# Patient Record
Sex: FEMALE | Race: WHITE | NOT HISPANIC OR LATINO | Employment: FULL TIME | URBAN - METROPOLITAN AREA
[De-identification: names, ages, dates, MRNs, and addresses within clinical notes are randomized per-mention and may not be internally consistent; named-entity substitution may affect disease eponyms.]

---

## 2018-10-09 ENCOUNTER — TELEPHONE (OUTPATIENT)
Dept: OBSTETRICS AND GYNECOLOGY | Age: 28
End: 2018-10-09

## 2018-10-09 NOTE — TELEPHONE ENCOUNTER
NNOB  lmp- 8/25/2018    Had an appointment 4/10/2018 for a new ob and no showed because she miscarried. Patient is now pregnant again and is wanting to see Dr. Cantu. Patient stated she use to see Dr. Barrow for her last pregnancy but prefers a male provider.       Okay to schedule with me if all right with Dr. Jeffries

## 2019-01-17 ENCOUNTER — APPOINTMENT (OUTPATIENT)
Dept: ULTRASOUND IMAGING | Facility: HOSPITAL | Age: 29
End: 2019-01-17

## 2019-01-17 ENCOUNTER — HOSPITAL ENCOUNTER (EMERGENCY)
Facility: HOSPITAL | Age: 29
Discharge: HOME | End: 2019-01-17
Attending: EMERGENCY MEDICINE | Admitting: EMERGENCY MEDICINE

## 2019-01-17 VITALS
HEIGHT: 69 IN | HEART RATE: 89 BPM | DIASTOLIC BLOOD PRESSURE: 85 MMHG | RESPIRATION RATE: 18 BRPM | WEIGHT: 215 LBS | TEMPERATURE: 98 F | OXYGEN SATURATION: 100 % | BODY MASS INDEX: 31.84 KG/M2 | SYSTOLIC BLOOD PRESSURE: 117 MMHG

## 2019-01-17 DIAGNOSIS — O20.0 THREATENED MISCARRIAGE: Primary | ICD-10-CM

## 2019-01-17 LAB
ABO GROUP BLD: NORMAL
ALBUMIN SERPL-MCNC: 3.8 G/DL (ref 3.5–5.2)
ALBUMIN/GLOB SERPL: 1.4 G/DL
ALP SERPL-CCNC: 76 U/L (ref 39–117)
ALT SERPL W P-5'-P-CCNC: 14 U/L (ref 1–33)
ANION GAP SERPL CALCULATED.3IONS-SCNC: 9.5 MMOL/L
AST SERPL-CCNC: 12 U/L (ref 1–32)
BASOPHILS # BLD AUTO: 0.04 10*3/MM3 (ref 0–0.2)
BASOPHILS NFR BLD AUTO: 0.5 % (ref 0–1.5)
BILIRUB SERPL-MCNC: 0.2 MG/DL (ref 0.1–1.2)
BLD GP AB SCN SERPL QL: NEGATIVE
BUN BLD-MCNC: 10 MG/DL (ref 6–20)
BUN/CREAT SERPL: 13.3 (ref 7–25)
CALCIUM SPEC-SCNC: 9.1 MG/DL (ref 8.6–10.5)
CHLORIDE SERPL-SCNC: 102 MMOL/L (ref 98–107)
CO2 SERPL-SCNC: 28.5 MMOL/L (ref 22–29)
CREAT BLD-MCNC: 0.75 MG/DL (ref 0.57–1)
DEPRECATED RDW RBC AUTO: 40.5 FL (ref 37–54)
EOSINOPHIL # BLD AUTO: 0.24 10*3/MM3 (ref 0–0.7)
EOSINOPHIL NFR BLD AUTO: 2.8 % (ref 0.3–6.2)
ERYTHROCYTE [DISTWIDTH] IN BLOOD BY AUTOMATED COUNT: 12.7 % (ref 11.7–13)
GFR SERPL CREATININE-BSD FRML MDRD: 111 ML/MIN/1.73
GFR SERPL CREATININE-BSD FRML MDRD: 91 ML/MIN/1.73
GLOBULIN UR ELPH-MCNC: 2.8 GM/DL
GLUCOSE BLD-MCNC: 94 MG/DL (ref 65–99)
HCG INTACT+B SERPL-ACNC: 16.79 MIU/ML
HCT VFR BLD AUTO: 40.9 % (ref 35.6–45.5)
HGB BLD-MCNC: 13.4 G/DL (ref 11.9–15.5)
IMM GRANULOCYTES # BLD AUTO: 0 10*3/MM3 (ref 0–0.03)
IMM GRANULOCYTES NFR BLD AUTO: 0 % (ref 0–0.5)
LYMPHOCYTES # BLD AUTO: 2.47 10*3/MM3 (ref 0.9–4.8)
LYMPHOCYTES NFR BLD AUTO: 28.4 % (ref 19.6–45.3)
MCH RBC QN AUTO: 28.9 PG (ref 26.9–32)
MCHC RBC AUTO-ENTMCNC: 32.8 G/DL (ref 32.4–36.3)
MCV RBC AUTO: 88.3 FL (ref 80.5–98.2)
MONOCYTES # BLD AUTO: 0.84 10*3/MM3 (ref 0.2–1.2)
MONOCYTES NFR BLD AUTO: 9.6 % (ref 5–12)
NEUTROPHILS # BLD AUTO: 5.12 10*3/MM3 (ref 1.9–8.1)
NEUTROPHILS NFR BLD AUTO: 58.7 % (ref 42.7–76)
PLATELET # BLD AUTO: 192 10*3/MM3 (ref 140–500)
PMV BLD AUTO: 10.2 FL (ref 6–12)
POTASSIUM BLD-SCNC: 4 MMOL/L (ref 3.5–5.2)
PROT SERPL-MCNC: 6.6 G/DL (ref 6–8.5)
RBC # BLD AUTO: 4.63 10*6/MM3 (ref 3.9–5.2)
RH BLD: POSITIVE
SODIUM BLD-SCNC: 140 MMOL/L (ref 136–145)
T&S EXPIRATION DATE: NORMAL
WBC NRBC COR # BLD: 8.71 10*3/MM3 (ref 4.5–10.7)

## 2019-01-17 PROCEDURE — 85025 COMPLETE CBC W/AUTO DIFF WBC: CPT | Performed by: EMERGENCY MEDICINE

## 2019-01-17 PROCEDURE — 76817 TRANSVAGINAL US OBSTETRIC: CPT

## 2019-01-17 PROCEDURE — 93976 VASCULAR STUDY: CPT

## 2019-01-17 PROCEDURE — 86850 RBC ANTIBODY SCREEN: CPT | Performed by: EMERGENCY MEDICINE

## 2019-01-17 PROCEDURE — 86900 BLOOD TYPING SEROLOGIC ABO: CPT | Performed by: EMERGENCY MEDICINE

## 2019-01-17 PROCEDURE — 80053 COMPREHEN METABOLIC PANEL: CPT | Performed by: EMERGENCY MEDICINE

## 2019-01-17 PROCEDURE — 86901 BLOOD TYPING SEROLOGIC RH(D): CPT | Performed by: EMERGENCY MEDICINE

## 2019-01-17 PROCEDURE — 99284 EMERGENCY DEPT VISIT MOD MDM: CPT

## 2019-01-17 PROCEDURE — 76815 OB US LIMITED FETUS(S): CPT

## 2019-01-17 PROCEDURE — 84702 CHORIONIC GONADOTROPIN TEST: CPT | Performed by: EMERGENCY MEDICINE

## 2019-01-17 RX ORDER — IBUPROFEN 200 MG
600 TABLET ORAL EVERY 6 HOURS PRN
COMMUNITY

## 2019-01-17 RX ORDER — PSEUDOEPHEDRINE HYDROCHLORIDE 60 MG/1
60 TABLET, FILM COATED ORAL EVERY 4 HOURS PRN
COMMUNITY

## 2019-01-17 NOTE — ED NOTES
Pt states that she had been having vaginal bleeding since 1/5/19. Pt states that she thinks that she is having a miscarriage but states that she has not taken a pregnancy test. Pt states that she had a miscarriage in April and one is September. Pt states that she missed her OBGYN appt on Friday and spoke with them Friday evening and they told her to come to the ED for evaluation but Pt thought it would stop so she waited. Pt also c/o nausea x's 1 week.   Gardenia Leone, SINGH  01/17/19 1814       Gardenia Leone, RN  01/17/19 1814

## 2019-01-17 NOTE — ED TRIAGE NOTES
Pt believes she may have been pregnant. Has been having vaginal bleeding x 2 weeks, came to ER to eval for miscarriage.

## 2019-01-18 NOTE — ED NOTES
Pt c/o vaginal bleeding that started 1/5/19. Denies abd cramping currently. Lmp:12/10/18. Pt states that she hasn't taken a pregnancy test because she doesn't want to see that its positive. Pt had two miscarriages previously     Swetha Garcia, RN  01/17/19 1941

## 2019-01-18 NOTE — ED PROVIDER NOTES
" EMERGENCY DEPARTMENT ENCOUNTER    Room Number:  35/35  PCP: Lenard Olmedo MD   OBGYN: Dr. Griffin  Historian: Patient, Significant Other      HPI  Chief Complaint: Vaginal Bleeding  Context: Rosanna Sullivan is a 28 y.o. female who presents to the ED c/o constant vaginal bleeding onset about 2 weeks ago. Pt reports that she has also had mild \"cramping\" lower abdominal pain and nausea. Pt denies vomiting, diarrhea, chest pain, dyspnea, dysuria, generalized weakness, and dizziness/lightheadedness. Pt reports that she may be pregnant currently. Pt reports that in the past year, pt has had 2 spontaneous miscarriages. G3, P1, Ab2. There are no other complaints at this time.       Location: Female genitourinary  Radiation: N/A  Character: \"vaginal bleeding\"  Duration: Onset about 2 weeks ago  Severity: Moderate  Progression: Unchanged  Aggravating Factors: Nothing  Alleviating Factors: Nothing          PAST MEDICAL HISTORY  Active Ambulatory Problems     Diagnosis Date Noted   • No Active Ambulatory Problems     Resolved Ambulatory Problems     Diagnosis Date Noted   • No Resolved Ambulatory Problems     No Additional Past Medical History         PAST SURGICAL HISTORY  Past Surgical History:   Procedure Laterality Date   • ADENOIDECTOMY     • APPENDECTOMY     •  SECTION     • GASTRIC SLEEVE LAPAROSCOPIC     • TONSILLECTOMY           FAMILY HISTORY  History reviewed. No pertinent family history.      SOCIAL HISTORY  Social History     Socioeconomic History   • Marital status: Single     Spouse name: Not on file   • Number of children: Not on file   • Years of education: Not on file   • Highest education level: Not on file   Social Needs   • Financial resource strain: Not on file   • Food insecurity - worry: Not on file   • Food insecurity - inability: Not on file   • Transportation needs - medical: Not on file   • Transportation needs - non-medical: Not on file   Occupational History   • Not on file   Tobacco " Use   • Smoking status: Current Some Day Smoker   Substance and Sexual Activity   • Alcohol use: No     Frequency: Never   • Drug use: No   • Sexual activity: Yes   Other Topics Concern   • Not on file   Social History Narrative   • Not on file         ALLERGIES  Patient has no known allergies.        REVIEW OF SYSTEMS  Review of Systems   Constitutional: Negative for chills.   HENT: Negative for congestion, rhinorrhea and sore throat.    Eyes: Negative for pain.   Respiratory: Negative for cough and shortness of breath.    Cardiovascular: Negative for chest pain, palpitations and leg swelling.   Gastrointestinal: Positive for abdominal pain and nausea. Negative for diarrhea and vomiting.   Genitourinary: Positive for vaginal bleeding. Negative for difficulty urinating, dysuria, flank pain and frequency.   Musculoskeletal: Negative for myalgias, neck pain and neck stiffness.   Skin: Negative for rash.   Neurological: Negative for dizziness, speech difficulty, weakness, light-headedness, numbness and headaches.   Psychiatric/Behavioral: Negative.    All other systems reviewed and are negative.           PHYSICAL EXAM  ED Triage Vitals   Temp Heart Rate Resp BP SpO2   01/17/19 1752 01/17/19 1752 01/17/19 1752 01/17/19 1814 01/17/19 1752   98 °F (36.7 °C) 88 16 147/80 100 %      Temp src Heart Rate Source Patient Position BP Location FiO2 (%)   -- 01/17/19 1814 01/17/19 1814 01/17/19 1814 --    Monitor Sitting Left arm        Physical Exam   Constitutional: She is oriented to person, place, and time. No distress.   HENT:   Head: Normocephalic.   Mouth/Throat: Mucous membranes are normal.   Eyes: EOM are normal. Pupils are equal, round, and reactive to light.   Neck: Normal range of motion. Neck supple.   Cardiovascular: Normal rate, regular rhythm and normal heart sounds.   Pulmonary/Chest: Effort normal and breath sounds normal. No respiratory distress. She has no decreased breath sounds. She has no wheezes. She has  no rhonchi. She has no rales.   Abdominal: Soft. There is no tenderness. There is no rebound and no guarding.   Genitourinary: Cervix exhibits no motion tenderness.   Genitourinary Comments: No vaginal bleeding. Cervical os is closed.    Musculoskeletal: Normal range of motion.   Neurological: She is alert and oriented to person, place, and time. She has normal sensation.   Skin: Skin is warm and dry.   Psychiatric: Mood and affect normal.   Nursing note and vitals reviewed.          LAB RESULTS  Recent Results (from the past 24 hour(s))   Comprehensive Metabolic Panel    Collection Time: 01/17/19  7:59 PM   Result Value Ref Range    Glucose 94 65 - 99 mg/dL    BUN 10 6 - 20 mg/dL    Creatinine 0.75 0.57 - 1.00 mg/dL    Sodium 140 136 - 145 mmol/L    Potassium 4.0 3.5 - 5.2 mmol/L    Chloride 102 98 - 107 mmol/L    CO2 28.5 22.0 - 29.0 mmol/L    Calcium 9.1 8.6 - 10.5 mg/dL    Total Protein 6.6 6.0 - 8.5 g/dL    Albumin 3.80 3.50 - 5.20 g/dL    ALT (SGPT) 14 1 - 33 U/L    AST (SGOT) 12 1 - 32 U/L    Alkaline Phosphatase 76 39 - 117 U/L    Total Bilirubin 0.2 0.1 - 1.2 mg/dL    eGFR Non African Amer 91 >60 mL/min/1.73    eGFR  African Amer 111 >60 mL/min/1.73    Globulin 2.8 gm/dL    A/G Ratio 1.4 g/dL    BUN/Creatinine Ratio 13.3 7.0 - 25.0    Anion Gap 9.5 mmol/L   hCG, Quantitative, Pregnancy    Collection Time: 01/17/19  7:59 PM   Result Value Ref Range    HCG Quantitative 16.79 mIU/mL   Type & Screen    Collection Time: 01/17/19  7:59 PM   Result Value Ref Range    ABO Type A     RH type Positive     Antibody Screen Negative     T&S Expiration Date 1/20/2019 11:59:59 PM    CBC Auto Differential    Collection Time: 01/17/19  7:59 PM   Result Value Ref Range    WBC 8.71 4.50 - 10.70 10*3/mm3    RBC 4.63 3.90 - 5.20 10*6/mm3    Hemoglobin 13.4 11.9 - 15.5 g/dL    Hematocrit 40.9 35.6 - 45.5 %    MCV 88.3 80.5 - 98.2 fL    MCH 28.9 26.9 - 32.0 pg    MCHC 32.8 32.4 - 36.3 g/dL    RDW 12.7 11.7 - 13.0 %    RDW-SD 40.5  37.0 - 54.0 fl    MPV 10.2 6.0 - 12.0 fL    Platelets 192 140 - 500 10*3/mm3    Neutrophil % 58.7 42.7 - 76.0 %    Lymphocyte % 28.4 19.6 - 45.3 %    Monocyte % 9.6 5.0 - 12.0 %    Eosinophil % 2.8 0.3 - 6.2 %    Basophil % 0.5 0.0 - 1.5 %    Immature Grans % 0.0 0.0 - 0.5 %    Neutrophils, Absolute 5.12 1.90 - 8.10 10*3/mm3    Lymphocytes, Absolute 2.47 0.90 - 4.80 10*3/mm3    Monocytes, Absolute 0.84 0.20 - 1.20 10*3/mm3    Eosinophils, Absolute 0.24 0.00 - 0.70 10*3/mm3    Basophils, Absolute 0.04 0.00 - 0.20 10*3/mm3    Immature Grans, Absolute 0.00 0.00 - 0.03 10*3/mm3       Ordered the above labs and reviewed the results.        RADIOLOGY  US Ob Transvaginal   Final Result   Small amount of endometrial fluid without IUP demonstrated.   Follow-up as above    FINDINGS: Longitudinal and transverse images of the pelvis were obtained  in both transabdominal and transvaginal fashions. Uterus measures 8.6 x  6 x 4.3 cm in greatest dimensions. A small collection of fluid is  present within the upper endometrial canal but without demonstrable  fetal pole, yolk sac, or cardiac activity. These findings may reflect  sequelae of failed pregnancy. Ovaries were identified. They're normal in  appearance. Normal color flow. No free fluid.     Exam does not exclude ectopic pregnancy. Obstetrical follow-up is  recommended.       This report was finalized on 1/17/2019 10:07 PM by Shyam Butterfield M.D.          US Ob Limited 1 + Fetuses -     Small amount of endometrial fluid without IUP demonstrated.  Follow-up as above          FINDINGS: Longitudinal and transverse images of the pelvis were obtained  in both transabdominal and transvaginal fashions. Uterus measures 8.6 x  6 x 4.3 cm in greatest dimensions. A small collection of fluid is  present within the upper endometrial canal but without demonstrable  fetal pole, yolk sac, or cardiac activity. These findings may reflect  sequelae of failed pregnancy. Ovaries were identified.  They're normal in  appearance. Normal color flow. No free fluid.     Exam does not exclude ectopic pregnancy. Obstetrical follow-up is  Recommended.     US Testicular or Ovarian Vascular Limited -       Small amount of endometrial fluid without IUP demonstrated.  Follow-up as above            FINDINGS: Longitudinal and transverse images of the pelvis were obtained  in both transabdominal and transvaginal fashions. Uterus measures 8.6 x  6 x 4.3 cm in greatest dimensions. A small collection of fluid is  present within the upper endometrial canal but without demonstrable  fetal pole, yolk sac, or cardiac activity. These findings may reflect  sequelae of failed pregnancy. Ovaries were identified. They're normal in  appearance. Normal color flow. No free fluid.     Exam does not exclude ectopic pregnancy. Obstetrical follow-up is  recommended.        Ordered the above noted radiological studies. Reviewed by me in PACS.         PROCEDURES  Procedures      MEDICATIONS GIVEN IN ER  Medications - No data to display          PROGRESS AND CONSULTS  ED Course as of Jan 17 2229   Thu Jan 17, 2019   2215 10:15 PM  Patient with vaginal bleeding.  Most likely spontaneous ab but could be increasing as well.  Needs OB follow up.  Prenatal vitamins. Rh positive.  Threatened ab precautions.  [SL]      ED Course User Index  [SL] Danyel Mc MD       7:51 PM:  Pt will be type and screened. Blood work ordered for further evaluation.    8:10 PM:  Pelvic exam was performed with female chaperone at pt's bedside.     10:14 PM:  Rechecked pt. Pt is resting comfortably and appears in no acute distress. Informed pt that her HCG quantitative is 16.79. Hgb is 13.4. Pt's pelvic US shows that there is a small amount of endometrial fluid without IUP identified. Pt was advised to f/u with her OBGYN in the office. Strict RTER warnings given. Pt agrees with plan for discharge.         MEDICAL DECISION MAKING      MDM  Number of Diagnoses or  Management Options     Amount and/or Complexity of Data Reviewed  Clinical lab tests: ordered and reviewed (HCG quantitative is 16.79. Hgb is 13.4.)  Tests in the radiology section of CPT®: ordered and reviewed (Pelvic US:  Small amount of endometrial fluid without IUP demonstrated.  Follow-up as above)    Patient Progress  Patient progress: stable             DIAGNOSIS  Final diagnoses:   Threatened miscarriage           DISPOSITION  Pt discharged.    DISCHARGE    Patient discharged in stable condition.    Reviewed implications of results, diagnosis, meds, responsibility to follow up, warning signs and symptoms of possible worsening, potential complications and reasons to return to ER.    Patient/Family voiced understanding of above instructions.    Discussed plan for discharge, as there is no emergent indication for admission. Pt/family is agreeable and understands need for follow up and repeat testing. Pt is aware that discharge does not mean that nothing is wrong but it indicates no emergency is present that requires admission and they must continue care with follow-up as given below or physician of their choice.     FOLLOW-UP  Lenard Olmedo MD  [address] [address] [address] [phone]    Schedule an appointment as soon as possible for a visit       Carmen Griffin MD  [address] [id number] [phone]    Schedule an appointment as soon as possible for a visit           Latest Documented Vital Signs:  As of 10:18 PM  BP- 147/80 HR- 86 Temp- 98 °F (36.7 °C) O2 sat- 100%        --  Documentation assistance provided by prerna Giles for Dr. Jesusita MD.  Information recorded by the scribe was done at my direction and has been verified and validated by me.             Jaclyn Giles  01/17/19 2227       Danyel Mc MD  01/17/19 2221